# Patient Record
Sex: FEMALE | Race: BLACK OR AFRICAN AMERICAN
[De-identification: names, ages, dates, MRNs, and addresses within clinical notes are randomized per-mention and may not be internally consistent; named-entity substitution may affect disease eponyms.]

---
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
## 2019-10-31 ENCOUNTER — HOSPITAL ENCOUNTER (EMERGENCY)
Dept: HOSPITAL 25 - ED | Age: 19
Discharge: HOME | End: 2019-10-31
Payer: COMMERCIAL

## 2019-10-31 VITALS — SYSTOLIC BLOOD PRESSURE: 107 MMHG | DIASTOLIC BLOOD PRESSURE: 74 MMHG

## 2019-10-31 DIAGNOSIS — R10.9: Primary | ICD-10-CM

## 2019-10-31 LAB
ALBUMIN SERPL BCG-MCNC: 4.4 G/DL (ref 3.2–5.2)
ALBUMIN/GLOB SERPL: 1.6 {RATIO} (ref 1–3)
ALP SERPL-CCNC: 41 U/L (ref 34–104)
ALT SERPL W P-5'-P-CCNC: 8 U/L (ref 7–52)
ANION GAP SERPL CALC-SCNC: 6 MMOL/L (ref 2–11)
AST SERPL-CCNC: 15 U/L (ref 13–39)
BASOPHILS # BLD AUTO: 0.1 10^3/UL (ref 0–0.2)
BUN SERPL-MCNC: 7 MG/DL (ref 6–24)
BUN/CREAT SERPL: 8.4 (ref 8–20)
CALCIUM SERPL-MCNC: 9.5 MG/DL (ref 8.6–10.3)
CHLORIDE SERPL-SCNC: 105 MMOL/L (ref 101–111)
EOSINOPHIL # BLD AUTO: 0.4 10^3/UL (ref 0–0.6)
GLOBULIN SER CALC-MCNC: 2.8 G/DL (ref 2–4)
GLUCOSE SERPL-MCNC: 116 MG/DL (ref 70–100)
HCG SERPL QL: < 0.6 MIU/ML
HCO3 SERPL-SCNC: 27 MMOL/L (ref 22–32)
HCT VFR BLD AUTO: 38 % (ref 35–47)
HGB BLD-MCNC: 13 G/DL (ref 12–16)
LYMPHOCYTES # BLD AUTO: 3.6 10^3/UL (ref 1–4.8)
MAGNESIUM SERPL-MCNC: 2 MG/DL (ref 1.9–2.7)
MCH RBC QN AUTO: 31 PG (ref 27–31)
MCHC RBC AUTO-ENTMCNC: 34 G/DL (ref 31–36)
MCV RBC AUTO: 90 FL (ref 80–97)
MONOCYTES # BLD AUTO: 0.5 10^3/UL (ref 0–0.8)
NEUTROPHILS # BLD AUTO: 4.9 10^3/UL (ref 1.5–7.7)
NRBC # BLD AUTO: 0 10^3/UL
NRBC BLD QL AUTO: 0.1
PLATELET # BLD AUTO: 273 10^3/UL (ref 150–450)
POTASSIUM SERPL-SCNC: 3.7 MMOL/L (ref 3.5–5)
PROT SERPL-MCNC: 7.2 G/DL (ref 6.4–8.9)
RBC # BLD AUTO: 4.24 10^6 /UL (ref 3.7–4.87)
RBC UR QL AUTO: (no result)
SODIUM SERPL-SCNC: 138 MMOL/L (ref 135–145)
WBC # BLD AUTO: 9.6 10^3/UL (ref 3.5–10.8)
WBC UR QL AUTO: (no result)

## 2019-10-31 PROCEDURE — 83605 ASSAY OF LACTIC ACID: CPT

## 2019-10-31 PROCEDURE — 83735 ASSAY OF MAGNESIUM: CPT

## 2019-10-31 PROCEDURE — 99282 EMERGENCY DEPT VISIT SF MDM: CPT

## 2019-10-31 PROCEDURE — 80053 COMPREHEN METABOLIC PANEL: CPT

## 2019-10-31 PROCEDURE — 36415 COLL VENOUS BLD VENIPUNCTURE: CPT

## 2019-10-31 PROCEDURE — 87086 URINE CULTURE/COLONY COUNT: CPT

## 2019-10-31 PROCEDURE — 85025 COMPLETE CBC W/AUTO DIFF WBC: CPT

## 2019-10-31 PROCEDURE — 81003 URINALYSIS AUTO W/O SCOPE: CPT

## 2019-10-31 PROCEDURE — 86140 C-REACTIVE PROTEIN: CPT

## 2019-10-31 PROCEDURE — 83690 ASSAY OF LIPASE: CPT

## 2019-10-31 PROCEDURE — 81015 MICROSCOPIC EXAM OF URINE: CPT

## 2019-10-31 PROCEDURE — 84702 CHORIONIC GONADOTROPIN TEST: CPT

## 2019-10-31 NOTE — ED
Abdominal Pain/Female





- HPI Summary


HPI Summary: 


Patient is a 19 y/o F presenting to [facility] with complaints of diffuse abdominal 

pain that onset tonight while she was putting covers on her bed. She 

characterizes pain as severe, but notes that ten minutes ago it began to lessen 

in severity. Patient states that she had difficulty standing up straight with 

the pain and had to hunch over to find comfort. Patient denies vaginal discharge

, dysuria, and changes in frequency of urination. Patient reports onset of 

menstrual cycle yesterday. She states that she has never had intercourse and is 

not on birth control. Patient denies previous similar episodes of Sx. She 

denies tobacco, alcohol and substance usage. Patient does note that she 

typically has abdominal pain with the first day of her menstrual cycle, but 

notes this time she had pain with the second day. She reports no PSHx. On triage

, pain is rated 9/10. Home medications and allergies are reviewed. 








- History of Current Complaint


Chief Complaint: EDAbdPain


Stated Complaint: ABD PAIN PER PT


Time Seen by Provider: 10/31/19 02:26


Hx Obtained From: Patient


Onset/Duration: Lasting Hours, Still Present


Timing: Hours


Severity Initially: Severe


Severity Currently: Severe


Pain Intensity: 9


Pain Scale Used: 0-10 Numeric


Location: Diffuse


Aggravating Factor(s): Other: - standing up straight


Alleviating Factor(s): Other: - hunching over


Associated Signs and Symptoms: Positive: Vaginal Bleeding - patient is on 

menstrual cycle.  Negative: Urinary Symptoms, Vaginal Discharge


Allergies/Adverse Reactions: 


 Allergies











Allergy/AdvReac Type Severity Reaction Status Date / Time


 


No Known Allergies Allergy   Verified 11/29/18 18:28











Home Medications: 


 Home Medications





NK [No Home Medications Reported]  10/31/19 [History Confirmed 10/31/19]











PMH/Surg Hx/FS Hx/Imm Hx


Endocrine/Hematology History: 


   Denies: Hx Anticoagulant Therapy


Cardiovascular History: 


   Denies: Hx Cardiac Arrest


 History: 


   Denies: Hx Dialysis


Neurological History: 


   Denies: Hx CVA


Psychiatric History: 


   Denies: Hx Autism


Infectious Disease History: No


Infectious Disease History: 


   Denies: Traveled Outside the [place] in Last 30 Days





- Family History


Known Family History: 


   Negative: Hypertension





- Social History


Alcohol Use: None


Substance Use Type: Reports: None


Smoking Status (MU): Never Smoked Tobacco





Review of Systems


Positive: Abdominal Pain


Genitourinary: Other - positive - vaginal bleeding, patient is on menstrual 

cycle 


Negative: dysuria, discharge - vaginal , frequency - of urination 


All Other Systems Reviewed And Are Negative: Yes





Physical Exam





- Summary


Physical Exam Summary: 


Constitutional: Well-developed, Well-nourished, Alert. (-) Distressed


Skin: Warm, Dry


HENT: Normocephalic; Atraumatic


Eyes: Conjunctiva normal


Neck: Musculoskeletal ROM normal neck. (-) JVD, (-) Stridor, (-) Tracheal 

deviation


Cardio: Rhythm regular, rate normal, Heart sounds normal; Intact distal pulses; 

The pedal pulses are 2+ and symmetric. Radial pulses are 2+ and symmetric.


Pulmonary/Chest wall: Effort normal. (-) Respiratory distress, (-) Wheezes, (-) 

Rales


Abd: Soft, (-) tenderness, (-) Distension, (-) Guarding, (-) Rebound


Musculoskeletal: (-) Edema


Neuro: Alert, Oriented x3


Psych: Mood and affect Normal








Triage Information Reviewed: Yes


Vital Signs On Initial Exam: 


 Initial Vitals











Temp Pulse Resp BP Pulse Ox


 


 98.0 F   83   18   131/113   95 


 


 10/31/19 01:22  10/31/19 01:22  10/31/19 01:22  10/31/19 01:22  10/31/19 01:22











Vital Signs Reviewed: Yes





Procedures





- Sedation


Patient Received Moderate/Deep Sedation with Procedure: No





Diagnostics





- Vital Signs


 Vital Signs











  Temp Pulse Resp BP Pulse Ox


 


 10/31/19 02:25   71    100


 


 10/31/19 02:23   67   120/69  100


 


 10/31/19 01:22  98.0 F  83  18  131/113  95














- Laboratory


Lab Results: 


 Lab Results











  10/31/19 10/31/19 10/31/19 Range/Units





  02:02 02:02 02:02 


 


WBC  9.6    (3.5-10.8)  10^3/uL


 


RBC  4.24    (3.70-4.87)  10^6 /uL


 


Hgb  13.0    (12.0-16.0)  g/dL


 


Hct  38    (35-47)  %


 


MCV  90    (80-97)  fL


 


MCH  31    (27-31)  pg


 


MCHC  34    (31-36)  g/dL


 


RDW  13    (10-15)  %


 


Plt Count  273    (150-450)  10^3/uL


 


MPV  9.1    (7.4-10.4)  fL


 


Neut % (Auto)  51.8    %


 


Lymph % (Auto)  37.7    %


 


Mono % (Auto)  5.7    %


 


Eos % (Auto)  4.1    %


 


Baso % (Auto)  0.7    %


 


Absolute Neuts (auto)  4.9    (1.5-7.7)  10^3/ul


 


Absolute Lymphs (auto)  3.6    (1.0-4.8)  10^3/ul


 


Absolute Monos (auto)  0.5    (0-0.8)  10^3/ul


 


Absolute Eos (auto)  0.4    (0-0.6)  10^3/ul


 


Absolute Basos (auto)  0.1    (0-0.2)  10^3/ul


 


Absolute Nucleated RBC  0.0    10^3/ul


 


Nucleated RBC %  0.1    


 


Sodium   138   (135-145)  mmol/L


 


Potassium   3.7   (3.5-5.0)  mmol/L


 


Chloride   105   (101-111)  mmol/L


 


Carbon Dioxide   27   (22-32)  mmol/L


 


Anion Gap   6   (2-11)  mmol/L


 


BUN   7   (6-24)  mg/dL


 


Creatinine   0.83   (0.51-0.95)  mg/dL


 


Est GFR ( Amer)   107.2   (>60)  


 


Est GFR (Non-Af Amer)   88.6   (>60)  


 


BUN/Creatinine Ratio   8.4   (8-20)  


 


Glucose   116 H   ()  mg/dL


 


Lactic Acid    1.3  (0.5-2.0)  mmol/L


 


Calcium   9.5   (8.6-10.3)  mg/dL


 


Magnesium   2.0   (1.9-2.7)  mg/dL


 


Total Bilirubin   0.30   (0.2-1.0)  mg/dL


 


AST   15   (13-39)  U/L


 


ALT   8   (7-52)  U/L


 


Alkaline Phosphatase   41   ()  U/L


 


C-Reactive Protein   < 1.00   (<8.01)  mg/L


 


Total Protein   7.2   (6.4-8.9)  g/dL


 


Albumin   4.4   (3.2-5.2)  g/dL


 


Globulin   2.8   (2-4)  g/dL


 


Albumin/Globulin Ratio   1.6   (1-3)  


 


Lipase   37   (11.0-82.0)  U/L


 


Beta HCG, Quant   < 0.60   mIU/mL


 


Urine Color     


 


Urine Appearance     


 


Urine pH     (5-9)  


 


Ur Specific Gravity     (1.010-1.030)  


 


Urine Protein     (Negative)  


 


Urine Ketones     (Negative)  


 


Urine Blood     (Negative)  


 


Urine Nitrate     (Negative)  


 


Urine Bilirubin     (Negative)  


 


Urine Urobilinogen     (Negative)  


 


Ur Leukocyte Esterase     (Negative)  


 


Urine WBC (Auto)     (Absent)  


 


Urine RBC (Auto)     (Absent)  


 


Ur Squamous Epith Cells     (Absent)  


 


Urine Bacteria     (Absent)  


 


Urine Glucose     (Negative)  














  10/31/19 Range/Units





  02:24 


 


WBC   (3.5-10.8)  10^3/uL


 


RBC   (3.70-4.87)  10^6 /uL


 


Hgb   (12.0-16.0)  g/dL


 


Hct   (35-47)  %


 


MCV   (80-97)  fL


 


MCH   (27-31)  pg


 


MCHC   (31-36)  g/dL


 


RDW   (10-15)  %


 


Plt Count   (150-450)  10^3/uL


 


MPV   (7.4-10.4)  fL


 


Neut % (Auto)   %


 


Lymph % (Auto)   %


 


Mono % (Auto)   %


 


Eos % (Auto)   %


 


Baso % (Auto)   %


 


Absolute Neuts (auto)   (1.5-7.7)  10^3/ul


 


Absolute Lymphs (auto)   (1.0-4.8)  10^3/ul


 


Absolute Monos (auto)   (0-0.8)  10^3/ul


 


Absolute Eos (auto)   (0-0.6)  10^3/ul


 


Absolute Basos (auto)   (0-0.2)  10^3/ul


 


Absolute Nucleated RBC   10^3/ul


 


Nucleated RBC %   


 


Sodium   (135-145)  mmol/L


 


Potassium   (3.5-5.0)  mmol/L


 


Chloride   (101-111)  mmol/L


 


Carbon Dioxide   (22-32)  mmol/L


 


Anion Gap   (2-11)  mmol/L


 


BUN   (6-24)  mg/dL


 


Creatinine   (0.51-0.95)  mg/dL


 


Est GFR ( Amer)   (>60)  


 


Est GFR (Non-Af Amer)   (>60)  


 


BUN/Creatinine Ratio   (8-20)  


 


Glucose   ()  mg/dL


 


Lactic Acid   (0.5-2.0)  mmol/L


 


Calcium   (8.6-10.3)  mg/dL


 


Magnesium   (1.9-2.7)  mg/dL


 


Total Bilirubin   (0.2-1.0)  mg/dL


 


AST   (13-39)  U/L


 


ALT   (7-52)  U/L


 


Alkaline Phosphatase   ()  U/L


 


C-Reactive Protein   (<8.01)  mg/L


 


Total Protein   (6.4-8.9)  g/dL


 


Albumin   (3.2-5.2)  g/dL


 


Globulin   (2-4)  g/dL


 


Albumin/Globulin Ratio   (1-3)  


 


Lipase   (11.0-82.0)  U/L


 


Beta HCG, Quant   mIU/mL


 


Urine Color  Yellow  


 


Urine Appearance  Clear  


 


Urine pH  6.0  (5-9)  


 


Ur Specific Gravity  1.021  (1.010-1.030)  


 


Urine Protein  Negative  (Negative)  


 


Urine Ketones  Negative  (Negative)  


 


Urine Blood  3+ A  (Negative)  


 


Urine Nitrate  Negative  (Negative)  


 


Urine Bilirubin  Negative  (Negative)  


 


Urine Urobilinogen  Negative  (Negative)  


 


Ur Leukocyte Esterase  Trace A  (Negative)  


 


Urine WBC (Auto)  Trace(0-5/hpf)  (Absent)  


 


Urine RBC (Auto)  3+(>10/hpf) A  (Absent)  


 


Ur Squamous Epith Cells  Present A  (Absent)  


 


Urine Bacteria  Absent  (Absent)  


 


Urine Glucose  Negative  (Negative)  











Result Diagrams: 


 10/31/19 02:02





 10/31/19 02:02


Lab Statement: Any lab studies that have been ordered have been reviewed, and 

results considered in the medical decision making process.





Abdominal Pain Fem Course/Dx





- Course


Course Of Treatment: Patient is a 19 y/o F presenting to [facility] with complaints 

of diffuse abdominal pain that onset tonight while she was putting covers on 

her bed. Physical exam is unremarkable. Bloodwork was negative. UA showed 3+ 

blood and RBC, trace leukocyte esterase and WBC, present squamous epith cells. 

Results were discussed with the patient. Patient was discharged to home and 

will follow up with PCP.





- Diagnoses


Provider Diagnoses: 


 Abdominal pain








Discharge ED





- Sign-Out/Discharge


Documenting (check all that apply): Patient Departure - discharge 





- Discharge Plan


Condition: Stable


Disposition: HOME


Patient Education Materials:  Acute Abdominal Pain (ED)


Referrals: 


[facility] [Z.BUSINESS, APPLICATION, OTHER] - 


No Primary Care Phys,NOPCP [Primary Care Provider] - 





- Billing Disposition and Condition


Condition: STABLE


Disposition: Home





- Attestation Statements


Document Initiated by Mikae: Yes


Documenting Scribe: MADDY MUNIZ


Provider For Whom Scribe is Documenting (Include Credential): DOMINGO VIRGEN MD


Scribe Attestation: 


I, MADDY MUNIZ, scribed for DOMINGO VIRGEN MD on 10/31/19 at 0729. 


Scribe Documentation Reviewed: Yes


Provider Attestation: 


The documentation as recorded by the [email or web address] accurately reflects 

the service I personally performed and the decisions made by me, DOMINGO VIRGEN MD


Status of Scribe Document: Viewed

## 2022-01-14 ENCOUNTER — NEW REFERRAL (OUTPATIENT)
Dept: URBAN - METROPOLITAN AREA CLINIC 52 | Facility: CLINIC | Age: 22
End: 2022-01-14

## 2022-01-14 VITALS — SYSTOLIC BLOOD PRESSURE: 128 MMHG | HEIGHT: 55 IN | HEART RATE: 87 BPM | DIASTOLIC BLOOD PRESSURE: 75 MMHG

## 2022-01-14 DIAGNOSIS — H35.412: ICD-10-CM

## 2022-01-14 DIAGNOSIS — H52.13: ICD-10-CM

## 2022-01-14 DIAGNOSIS — H53.002: ICD-10-CM

## 2022-01-14 PROCEDURE — 92201 OPSCPY EXTND RTA DRAW UNI/BI: CPT

## 2022-01-14 PROCEDURE — 92004 COMPRE OPH EXAM NEW PT 1/>: CPT

## 2022-01-14 ASSESSMENT — VISUAL ACUITY
OS_CC: 20/60-
OD_CC: 20/25
OS_CC: 20/25
OD_CC: 20/30-

## 2022-01-14 ASSESSMENT — TONOMETRY
OS_IOP_MMHG: 22
OD_IOP_MMHG: 23

## 2022-07-03 ENCOUNTER — NEW REFERRAL (OUTPATIENT)
Dept: URBAN - METROPOLITAN AREA CLINIC 19 | Facility: CLINIC | Age: 22
End: 2022-07-03

## 2022-07-03 DIAGNOSIS — H52.13: ICD-10-CM

## 2022-07-03 DIAGNOSIS — H53.002: ICD-10-CM

## 2022-07-03 DIAGNOSIS — H35.412: ICD-10-CM

## 2022-07-03 PROCEDURE — 92014 COMPRE OPH EXAM EST PT 1/>: CPT

## 2022-07-03 PROCEDURE — 92202 OPSCPY EXTND ON/MAC DRAW: CPT

## 2022-07-05 ASSESSMENT — VISUAL ACUITY
OS_PH: 20/60
OD_PH: 20/50
OD_SC: 20/100
OS_SC: 20/200

## 2022-07-05 ASSESSMENT — TONOMETRY
OD_IOP_MMHG: 18
OS_IOP_MMHG: 17

## 2022-08-05 ENCOUNTER — FOLLOW UP (OUTPATIENT)
Dept: URBAN - METROPOLITAN AREA CLINIC 52 | Facility: CLINIC | Age: 22
End: 2022-08-05

## 2022-08-05 DIAGNOSIS — H53.002: ICD-10-CM

## 2022-08-05 DIAGNOSIS — H35.412: ICD-10-CM

## 2022-08-05 DIAGNOSIS — H52.13: ICD-10-CM

## 2022-08-05 PROCEDURE — 92201 OPSCPY EXTND RTA DRAW UNI/BI: CPT

## 2022-08-05 PROCEDURE — 92014 COMPRE OPH EXAM EST PT 1/>: CPT

## 2022-08-05 ASSESSMENT — VISUAL ACUITY
OD_CC: 20/25
OS_CC: 20/70-2
OS_PH: 20/40-2

## 2022-08-05 ASSESSMENT — TONOMETRY
OS_IOP_MMHG: 19
OD_IOP_MMHG: 19

## 2023-02-01 ENCOUNTER — FOLLOW UP (OUTPATIENT)
Dept: URBAN - METROPOLITAN AREA CLINIC 52 | Facility: CLINIC | Age: 23
End: 2023-02-01

## 2023-02-01 DIAGNOSIS — H53.002: ICD-10-CM

## 2023-02-01 DIAGNOSIS — H35.412: ICD-10-CM

## 2023-02-01 DIAGNOSIS — H52.13: ICD-10-CM

## 2023-02-01 PROCEDURE — 92134 CPTRZ OPH DX IMG PST SGM RTA: CPT

## 2023-02-01 PROCEDURE — 92202 OPSCPY EXTND ON/MAC DRAW: CPT

## 2023-02-01 PROCEDURE — 92014 COMPRE OPH EXAM EST PT 1/>: CPT

## 2023-02-01 ASSESSMENT — TONOMETRY
OD_IOP_MMHG: 20
OS_IOP_MMHG: 20

## 2023-02-01 ASSESSMENT — VISUAL ACUITY
OD_CC: 20/25
OS_CC: 20/60

## 2024-02-14 ENCOUNTER — FOLLOW UP (OUTPATIENT)
Dept: URBAN - METROPOLITAN AREA CLINIC 52 | Facility: CLINIC | Age: 24
End: 2024-02-14

## 2024-02-14 DIAGNOSIS — H53.002: ICD-10-CM

## 2024-02-14 DIAGNOSIS — H52.13: ICD-10-CM

## 2024-02-14 DIAGNOSIS — H35.412: ICD-10-CM

## 2024-02-14 DIAGNOSIS — H40.013: ICD-10-CM

## 2024-02-14 PROCEDURE — 92201 OPSCPY EXTND RTA DRAW UNI/BI: CPT

## 2024-02-14 PROCEDURE — 92134 CPTRZ OPH DX IMG PST SGM RTA: CPT

## 2024-02-14 PROCEDURE — 92014 COMPRE OPH EXAM EST PT 1/>: CPT

## 2024-02-14 ASSESSMENT — TONOMETRY
OD_IOP_MMHG: 21
OS_IOP_MMHG: 21
OD_IOP_MMHG: 29
OS_IOP_MMHG: 27

## 2024-02-14 ASSESSMENT — VISUAL ACUITY
OD_CC: 20/25
OS_CC: 20/60

## 2025-02-26 ENCOUNTER — FOLLOW UP (OUTPATIENT)
Age: 25
End: 2025-02-26

## 2025-02-26 DIAGNOSIS — H40.013: ICD-10-CM

## 2025-02-26 DIAGNOSIS — H35.412: ICD-10-CM

## 2025-02-26 DIAGNOSIS — H52.13: ICD-10-CM

## 2025-02-26 DIAGNOSIS — H53.002: ICD-10-CM

## 2025-02-26 PROCEDURE — 92134 CPTRZ OPH DX IMG PST SGM RTA: CPT | Mod: NC

## 2025-02-26 PROCEDURE — 92014 COMPRE OPH EXAM EST PT 1/>: CPT

## 2025-02-26 PROCEDURE — 92201 OPSCPY EXTND RTA DRAW UNI/BI: CPT

## 2025-02-26 ASSESSMENT — TONOMETRY
OS_IOP_MMHG: 21
OD_IOP_MMHG: 21

## 2025-02-26 ASSESSMENT — VISUAL ACUITY
OS_CC: 20/40+2
OD_CC: 20/25+1